# Patient Record
Sex: FEMALE | Race: WHITE | NOT HISPANIC OR LATINO
[De-identification: names, ages, dates, MRNs, and addresses within clinical notes are randomized per-mention and may not be internally consistent; named-entity substitution may affect disease eponyms.]

---

## 2017-07-20 ENCOUNTER — RX RENEWAL (OUTPATIENT)
Age: 57
End: 2017-07-20

## 2018-07-17 ENCOUNTER — RX RENEWAL (OUTPATIENT)
Age: 58
End: 2018-07-17

## 2018-10-31 ENCOUNTER — RX RENEWAL (OUTPATIENT)
Age: 58
End: 2018-10-31

## 2019-01-29 ENCOUNTER — RX RENEWAL (OUTPATIENT)
Age: 59
End: 2019-01-29

## 2019-03-06 ENCOUNTER — APPOINTMENT (OUTPATIENT)
Dept: ENDOCRINOLOGY | Facility: CLINIC | Age: 59
End: 2019-03-06
Payer: COMMERCIAL

## 2019-03-06 VITALS
HEIGHT: 64.5 IN | HEART RATE: 66 BPM | WEIGHT: 114 LBS | SYSTOLIC BLOOD PRESSURE: 120 MMHG | BODY MASS INDEX: 19.23 KG/M2 | OXYGEN SATURATION: 98 % | DIASTOLIC BLOOD PRESSURE: 72 MMHG

## 2019-03-06 PROCEDURE — ZZZZZ: CPT

## 2019-03-06 PROCEDURE — 99214 OFFICE O/P EST MOD 30 MIN: CPT | Mod: 25

## 2019-03-06 PROCEDURE — 77080 DXA BONE DENSITY AXIAL: CPT

## 2019-03-06 NOTE — PHYSICAL EXAM
[Alert] : alert [No Acute Distress] : no acute distress [Well Nourished] : well nourished [Well Developed] : well developed [Normal Sclera/Conjunctiva] : normal sclera/conjunctiva [EOMI] : extra ocular movement intact [No Proptosis] : no proptosis [Normal Oropharynx] : the oropharynx was normal [Thyroid Not Enlarged] : the thyroid was not enlarged [No Thyroid Nodules] : there were no palpable thyroid nodules [No Respiratory Distress] : no respiratory distress [No Accessory Muscle Use] : no accessory muscle use [Clear to Auscultation] : lungs were clear to auscultation bilaterally [Normal Rate] : heart rate was normal  [Normal S1, S2] : normal S1 and S2 [Regular Rhythm] : with a regular rhythm [No Edema] : there was no peripheral edema [Normal Bowel Sounds] : normal bowel sounds [Not Tender] : non-tender [Soft] : abdomen soft [Not Distended] : not distended [Post Cervical Nodes] : posterior cervical nodes [Anterior Cervical Nodes] : anterior cervical nodes [Normal] : normal and non tender [No Spinal Tenderness] : no spinal tenderness [Spine Straight] : spine straight [No Stigmata of Cushings Syndrome] : no stigmata of cushings syndrome [Normal Gait] : normal gait [Normal Strength/Tone] : muscle strength and tone were normal [No Rash] : no rash [Normal Reflexes] : deep tendon reflexes were 2+ and symmetric [No Tremors] : no tremors [Oriented x3] : oriented to person, place, and time

## 2019-03-07 ENCOUNTER — RX RENEWAL (OUTPATIENT)
Age: 59
End: 2019-03-07

## 2019-03-07 LAB
25(OH)D3 SERPL-MCNC: 31.5 NG/ML
ALBUMIN SERPL ELPH-MCNC: 4.8 G/DL
ALP BLD-CCNC: 66 U/L
ALT SERPL-CCNC: 14 U/L
ANION GAP SERPL CALC-SCNC: 18 MMOL/L
AST SERPL-CCNC: 18 U/L
BASOPHILS # BLD AUTO: 0.02 K/UL
BASOPHILS NFR BLD AUTO: 0.5 %
BILIRUB SERPL-MCNC: 0.4 MG/DL
BUN SERPL-MCNC: 16 MG/DL
CALCIUM SERPL-MCNC: 10 MG/DL
CALCIUM SERPL-MCNC: 10 MG/DL
CHLORIDE SERPL-SCNC: 104 MMOL/L
CO2 SERPL-SCNC: 22 MMOL/L
CREAT SERPL-MCNC: 0.68 MG/DL
EOSINOPHIL # BLD AUTO: 0.04 K/UL
EOSINOPHIL NFR BLD AUTO: 0.9 %
GLUCOSE SERPL-MCNC: 107 MG/DL
HCT VFR BLD CALC: 37 %
HGB BLD-MCNC: 12.1 G/DL
IMM GRANULOCYTES NFR BLD AUTO: 0 %
LYMPHOCYTES # BLD AUTO: 1.75 K/UL
LYMPHOCYTES NFR BLD AUTO: 41.5 %
MAN DIFF?: NORMAL
MCHC RBC-ENTMCNC: 32.2 PG
MCHC RBC-ENTMCNC: 32.7 GM/DL
MCV RBC AUTO: 98.4 FL
MONOCYTES # BLD AUTO: 0.35 K/UL
MONOCYTES NFR BLD AUTO: 8.3 %
NEUTROPHILS # BLD AUTO: 2.06 K/UL
NEUTROPHILS NFR BLD AUTO: 48.8 %
PARATHYROID HORMONE INTACT: 28 PG/ML
PHOSPHATE SERPL-MCNC: 4 MG/DL
PLATELET # BLD AUTO: 137 K/UL
POTASSIUM SERPL-SCNC: 3.8 MMOL/L
PROT SERPL-MCNC: 7.3 G/DL
RBC # BLD: 3.76 M/UL
RBC # FLD: 12 %
SODIUM SERPL-SCNC: 144 MMOL/L
TSH SERPL-ACNC: 0.84 UIU/ML
TTG IGA SER IA-ACNC: <1.2 U/ML
TTG IGA SER-ACNC: NEGATIVE
TTG IGG SER IA-ACNC: 1.8 U/ML
TTG IGG SER IA-ACNC: NEGATIVE
WBC # FLD AUTO: 4.22 K/UL

## 2019-03-07 NOTE — PROCEDURE
[FreeTextEntry1] : Bone mineral density 3/6/19\par indication: vs 2015\par spine -3.8 osteoporosis -12.3%\par total hip -2.8 osteoporosis -10.2%\par femoral neck -2.8 osteoporosis -7.7%\par proximal radius -3.8% osteoporosis -14.8%\par \par \par bone mineral density test Marzena 10, 2015\par indication assess response to raloxifene\par spine -2.9, osteoporosis, improved versus 2013, prior result -3.3.\par Total hip -2.3, osteopenia\par Femoral neck -2.3, osteopenia\par Proximal radius -2.4, osteopenia

## 2019-03-07 NOTE — HISTORY OF PRESENT ILLNESS
[Calcium (dietary)] : dietary Calcium [Family History of Breast Cancer] : family history of breast cancer [Regular Dental Follow-Up] : regular dental follow-up [FreeTextEntry1] : 2 1/2 year f/u 58 year-old female  for osteoporosis. Patient generally good health.\par Began raloxifene tolerating well, no leg cramps, hot flashes  or DVT. no new health issues. \par \par Labs BMD 7/2015 improved spine. \par    bone mineral density test  T score -3.3 in spine, -1.5 in femoral neck, -2.3 in  total hip. Patient has no previous history of fractures. She has no unusual risk factors for osteoporosis. She began Fosamax but stopped after she had a syncopal episode. \par Family history is notable for breast cancer her mother.  + routine mammogram, although no recent  Gyn care.  [Amenorrhea] : no past or present history of amenorrhea [Disordered Eating] : no past or present history of disordered eating [Taking Steroids] : no past or present history of taking steroids [Kidney Stones] : no history of kidney stones [High Fall Risk] : no fall risk [Family History of Osteoporosis] : no family history of osteoporosis [Family History of Hip Fracture] : no family history of hip fracture [Hyperparathyroidism] : no hyperparathyroidism [History of Radiation Therapy] : no history of radiation therapy [History of Blood Clots] : no history of blood clots [Previous Fragility Fracture] : no previous fragility fracture

## 2019-03-07 NOTE — ASSESSMENT
[Bisphosphonate Therapy] : Risks  and benefits of bisphosphonate therapy were  discussed with the patient including gastroesophageal irritation, osteonecrosis of the jaw, and atypical femur fractures, and acute phase reaction [Raloxifene Therapy] : Risks and benefits of Raloxifene therapy were discussed with the patient including blood clots, hot flashes, and cramps [FreeTextEntry1] : 58-year-old with low spinal bone density in the early menopausal period. No unusual risk factors for osteoporosis.\par tolerating Evista, but  BMD markedly decreased all sites. \par \par  Requested routine blood work to exclude new secondary causes such as vitamin D deficiency. Options of more aggressive therapy discussed in great detail. The patient has elected to begin ibandronate monthly.\par  Can continue Evista for  family history of breast cancer 90 mother  of breast cancer. .recommend calcium 500 mg per day and vitamin D 1000 units per day. controversy concerning calcium and cardiovascular risk discussed.

## 2019-07-02 ENCOUNTER — APPOINTMENT (OUTPATIENT)
Dept: ENDOCRINOLOGY | Facility: CLINIC | Age: 59
End: 2019-07-02

## 2019-08-21 ENCOUNTER — RX RENEWAL (OUTPATIENT)
Age: 59
End: 2019-08-21

## 2019-11-07 ENCOUNTER — RX RENEWAL (OUTPATIENT)
Age: 59
End: 2019-11-07

## 2019-12-24 ENCOUNTER — APPOINTMENT (OUTPATIENT)
Dept: ENDOCRINOLOGY | Facility: CLINIC | Age: 59
End: 2019-12-24

## 2019-12-31 ENCOUNTER — RX RENEWAL (OUTPATIENT)
Age: 59
End: 2019-12-31

## 2020-03-04 ENCOUNTER — APPOINTMENT (OUTPATIENT)
Dept: ENDOCRINOLOGY | Facility: CLINIC | Age: 60
End: 2020-03-04
Payer: COMMERCIAL

## 2020-03-04 VITALS
WEIGHT: 116 LBS | HEART RATE: 64 BPM | DIASTOLIC BLOOD PRESSURE: 60 MMHG | BODY MASS INDEX: 19.33 KG/M2 | OXYGEN SATURATION: 98 % | HEIGHT: 64.8 IN | SYSTOLIC BLOOD PRESSURE: 104 MMHG

## 2020-03-04 PROCEDURE — 77080 DXA BONE DENSITY AXIAL: CPT

## 2020-03-04 PROCEDURE — 99214 OFFICE O/P EST MOD 30 MIN: CPT | Mod: 25

## 2020-03-04 PROCEDURE — ZZZZZ: CPT

## 2020-03-04 NOTE — HISTORY OF PRESENT ILLNESS
[Calcium (dietary)] : dietary Calcium [Family History of Breast Cancer] : family history of breast cancer [Regular Dental Follow-Up] : regular dental follow-up [FreeTextEntry1] : f/u 59 year-old female  for osteoporosis. Patient generally good health.\par \par Prior bone mineral density test  T score -3.3 in spine, -1.5 in femoral neck, -2.3 in  total hip. Patient has no previous history of fractures. She has no unusual risk factors for osteoporosis. She began Fosamax but stopped after she had a syncopal episode. \par Family history is notable for breast cancer her mother. BMD 7/2015 improved spine. Pt started Evista 11/2016 for primary prevention of breast CA. Taking correctly, tolerating well. No hot flashes, no leg cramps, no DVT. No interval fx. Up to date with mammography and GYN. BMD 3/2019 showed severely low osteoporosis in spine, osteopenia at all other sites. Pt started Boniva 3/2019. Taking correctly, tolerating well. No interval fx, no UGI sx, no thigh pain. No aches & pains. No heartburn. Last DDS within past 3 months. No ONJ. [Amenorrhea] : no past or present history of amenorrhea [Disordered Eating] : no past or present history of disordered eating [Taking Steroids] : no past or present history of taking steroids [Kidney Stones] : no history of kidney stones [High Fall Risk] : no fall risk [Family History of Osteoporosis] : no family history of osteoporosis [Family History of Hip Fracture] : no family history of hip fracture [Hyperparathyroidism] : no hyperparathyroidism [History of Radiation Therapy] : no history of radiation therapy [History of Blood Clots] : no history of blood clots [Previous Fragility Fracture] : no previous fragility fracture

## 2020-03-04 NOTE — PROCEDURE
[FreeTextEntry1] : Bone mineral density: 03/04/2020 \par Indication: vs. 2019 prior test showed bone loss\par Spine: (L2-L4) -4.3 osteoporosis, no significant change\par Total hip: -2.4 osteopenia, +9.1%\par Femoral neck: -2.5 osteoporosis, +5.9%\par Proximal radius: -3.4 osteoporosis, +4.5%\par \par Bone mineral density 3/6/19\par indication: vs 2015\par spine -3.8 osteoporosis -12.3%\par total hip -2.8 osteoporosis -10.2%\par femoral neck -2.8 osteoporosis -7.7%\par proximal radius -3.8% osteoporosis -14.8%\par \par bone mineral density test Marzena 10, 2015\par indication assess response to raloxifene\par spine -2.9, osteoporosis, improved versus 2013, prior result -3.3.\par Total hip -2.3, osteopenia\par Femoral neck -2.3, osteopenia\par Proximal radius -2.4, osteopenia

## 2020-03-04 NOTE — END OF VISIT
[FreeTextEntry3] : I, Stefano Cano, authored this note working as a medical scribe for Dr. Cabrales.  03/04/2020. 11:00AM.  This note was authored by the medical scribe for me. I have reviewed, edited, and revised the note as needed. I am in agreement with the exam findings, imaging findings, and treatment plan.  Jett Cabrales MD

## 2020-03-04 NOTE — ASSESSMENT
[Raloxifene Therapy] : Risks and benefits of Raloxifene therapy were discussed with the patient including blood clots, hot flashes, and cramps [Bisphosphonate Therapy] : Risks  and benefits of bisphosphonate therapy were  discussed with the patient including gastroesophageal irritation, osteonecrosis of the jaw, and atypical femur fractures, and acute phase reaction [FreeTextEntry1] : 59 year-old w/ severe  osteoporosis. \par \par She has no unusual risk factors for osteoporosis. She began Fosamax but stopped after she had a syncopal episode. Family history is notable for breast cancer her mother. BMD 7/2015 improved spine.  Pt started Evista 11/2016 for primary prevention of breast CA. Taking correctly, tolerating well. No hot flashes, no leg cramps, no DVT. No interval fx. \par BMD 3/2019 showed severely low osteoporosis in spine, osteopenia at all other sites. Pt started Boniva 3/2019. Taking correctly, tolerating well. No interval fx, no UGI sx, no thigh pain. No aches & pains. No heartburn. No ONJ. BMD 3/2020 indicates stable but severely low osteoporosis in spine, improving osteopenia in total hip, and improving osteoporosis in femoral neck and proximal radius. BMD results reviewed w/ pt.\par Patient again advised that she has at high risk for fracture based on severely low bone density.  Options of anabolic therapy again discussed in great detail.  Patient has elected to continue Boniva for 1 additional year and repeat bone density then.\par Request labs sent out.\par \par f/u in 1 year w/ repeat BMD

## 2020-03-04 NOTE — PHYSICAL EXAM
[Alert] : alert [Well Nourished] : well nourished [No Acute Distress] : no acute distress [EOMI] : extra ocular movement intact [Well Developed] : well developed [Normal Sclera/Conjunctiva] : normal sclera/conjunctiva [No Proptosis] : no proptosis [Normal Oropharynx] : the oropharynx was normal [Thyroid Not Enlarged] : the thyroid was not enlarged [No Thyroid Nodules] : there were no palpable thyroid nodules [No Respiratory Distress] : no respiratory distress [Normal Rate] : heart rate was normal  [No Accessory Muscle Use] : no accessory muscle use [Clear to Auscultation] : lungs were clear to auscultation bilaterally [Normal S1, S2] : normal S1 and S2 [Regular Rhythm] : with a regular rhythm [Normal Bowel Sounds] : normal bowel sounds [No Edema] : there was no peripheral edema [Soft] : abdomen soft [Not Tender] : non-tender [Not Distended] : not distended [Post Cervical Nodes] : posterior cervical nodes [Normal] : normal and non tender [No Spinal Tenderness] : no spinal tenderness [Anterior Cervical Nodes] : anterior cervical nodes [Spine Straight] : spine straight [No Stigmata of Cushings Syndrome] : no stigmata of cushings syndrome [No Rash] : no rash [Normal Gait] : normal gait [Normal Strength/Tone] : muscle strength and tone were normal [Normal Reflexes] : deep tendon reflexes were 2+ and symmetric [No Tremors] : no tremors [Oriented x3] : oriented to person, place, and time

## 2020-03-05 LAB
ALBUMIN SERPL ELPH-MCNC: 4.7 G/DL
ALP BLD-CCNC: 47 U/L
ALT SERPL-CCNC: 10 U/L
ANION GAP SERPL CALC-SCNC: 12 MMOL/L
AST SERPL-CCNC: 15 U/L
BILIRUB SERPL-MCNC: 0.4 MG/DL
BUN SERPL-MCNC: 17 MG/DL
CALCIUM SERPL-MCNC: 9.4 MG/DL
CHLORIDE SERPL-SCNC: 108 MMOL/L
CO2 SERPL-SCNC: 25 MMOL/L
CREAT SERPL-MCNC: 0.59 MG/DL
GLUCOSE SERPL-MCNC: 110 MG/DL
POTASSIUM SERPL-SCNC: 4.7 MMOL/L
PROT SERPL-MCNC: 6.7 G/DL
SODIUM SERPL-SCNC: 145 MMOL/L

## 2020-07-31 ENCOUNTER — RX RENEWAL (OUTPATIENT)
Age: 60
End: 2020-07-31

## 2021-04-09 ENCOUNTER — RX RENEWAL (OUTPATIENT)
Age: 61
End: 2021-04-09

## 2021-04-15 ENCOUNTER — APPOINTMENT (OUTPATIENT)
Dept: ENDOCRINOLOGY | Facility: CLINIC | Age: 61
End: 2021-04-15
Payer: COMMERCIAL

## 2021-04-15 VITALS — BODY MASS INDEX: 19.23 KG/M2 | TEMPERATURE: 98 F | HEIGHT: 64.6 IN | WEIGHT: 114 LBS

## 2021-04-15 VITALS
OXYGEN SATURATION: 98 % | TEMPERATURE: 97.2 F | HEART RATE: 74 BPM | SYSTOLIC BLOOD PRESSURE: 110 MMHG | BODY MASS INDEX: 19.21 KG/M2 | WEIGHT: 114 LBS | DIASTOLIC BLOOD PRESSURE: 80 MMHG

## 2021-04-15 DIAGNOSIS — Z91.89 OTHER SPECIFIED PERSONAL RISK FACTORS, NOT ELSEWHERE CLASSIFIED: ICD-10-CM

## 2021-04-15 DIAGNOSIS — M81.0 AGE-RELATED OSTEOPOROSIS W/OUT CURRENT PATHOLOGICAL FRACTURE: ICD-10-CM

## 2021-04-15 PROCEDURE — ZZZZZ: CPT

## 2021-04-15 PROCEDURE — 99214 OFFICE O/P EST MOD 30 MIN: CPT | Mod: 25

## 2021-04-15 PROCEDURE — 77080 DXA BONE DENSITY AXIAL: CPT

## 2021-04-15 PROCEDURE — 99072 ADDL SUPL MATRL&STAF TM PHE: CPT

## 2021-04-16 NOTE — PROCEDURE
[FreeTextEntry1] : Bone mineral density: 04/15/2021 \par Indication: vs. 2020 assess response to medication\par Spine: (L2-L4) -4.2 osteoporosis, no significant change\par Total hip: -2.4 osteopenia, no significant change\par Femoral neck: -2.7 osteoporosis, no significant change\par Proximal radius: -3.4 osteoporosis, no significant change\par \par Bone mineral density: 03/04/2020 \par Indication: vs. 2019 prior test showed bone loss\par Spine: (L2-L4) -4.3 osteoporosis, no significant change\par Total hip: -2.4 osteopenia, +9.1%\par Femoral neck: -2.5 osteoporosis, +5.9%\par Proximal radius: -3.4 osteoporosis, +4.5%\par \par Bone mineral density 3/6/19\par indication: vs 2015\par spine -3.8 osteoporosis -12.3%\par total hip -2.8 osteoporosis -10.2%\par femoral neck -2.8 osteoporosis -7.7%\par proximal radius -3.8% osteoporosis -14.8%\par \par bone mineral density test Marzena 10, 2015\par indication assess response to raloxifene\par spine -2.9, osteoporosis, improved versus 2013, prior result -3.3.\par Total hip -2.3, osteopenia\par Femoral neck -2.3, osteopenia\par Proximal radius -2.4, osteopenia

## 2021-04-16 NOTE — PHYSICAL EXAM
[Alert] : alert [Well Nourished] : well nourished [No Acute Distress] : no acute distress [Well Developed] : well developed [Normal Sclera/Conjunctiva] : normal sclera/conjunctiva [EOMI] : extra ocular movement intact [No Proptosis] : no proptosis [Thyroid Not Enlarged] : the thyroid was not enlarged [No Thyroid Nodules] : no palpable thyroid nodules [Clear to Auscultation] : lungs were clear to auscultation bilaterally [Normal S1, S2] : normal S1 and S2 [Normal Rate] : heart rate was normal [Regular Rhythm] : with a regular rhythm [No Edema] : no peripheral edema [Normal Bowel Sounds] : normal bowel sounds [Not Tender] : non-tender [Not Distended] : not distended [Soft] : abdomen soft [Normal Anterior Cervical Nodes] : no anterior cervical lymphadenopathy [No Spinal Tenderness] : no spinal tenderness [Spine Straight] : spine straight [No Stigmata of Cushings Syndrome] : no stigmata of Cushings Syndrome [Normal Gait] : normal gait [Normal Reflexes] : deep tendon reflexes were 2+ and symmetric [No Tremors] : no tremors [Oriented x3] : oriented to person, place, and time [de-identified] : vague fullness in distal right femur

## 2021-04-16 NOTE — ASSESSMENT
[Denosumab Therapy] : Risks  and benefits of denosumab therapy were discussed with the patient including eczema, cellulitis, osteonecrosis of the jaw and atypical femur fractures [Teriparatide/Abaloparatide Therapy] : Risks and benefits of Teriparatide/Abaloparatide therapy were discussed with the patient including potential risk of osteosarcoma [Other____] : Risks and benefits of [unfilled] was discussed with the patient. [FreeTextEntry1] : 60 year-old w/ severe  osteoporosis. \par \par She has no unusual risk factors for osteoporosis. She began Fosamax but stopped after she had a syncopal episode. Family history is notable for breast cancer her mother. BMD 7/2015 improved spine.  Pt started Evista 11/2016 for primary prevention of breast CA. Taking correctly, tolerating well. No hot flashes, no leg cramps, no DVT. No interval fx. C/w Evista.\par  \par BMD 3/2019 showed severely low osteoporosis in spine, osteopenia at all other sites. Pt started Boniva 3/2019. Took correctly, tolerated well. No interval fx, no UGI sx. No aches & pains. No heartburn. No ONJ. BMD 3/2020 indicates stable but severely low osteoporosis in spine, improving osteopenia in total hip, and improving osteoporosis in femoral neck and proximal radius. BMD 4/2021 indicates stable but low osteoporosis in spine, stable osteoporosis in femoral neck and proximal radius; stable osteopenia in total hip. BMD results reviewed w/ pt.\par \par Of note, pt had thigh pain 12/2020, d/c Boniva for 1 month and restarted Boniva 1/2021, no recurrent thigh pain. Pt then had persistent diarrhea, then later developed c. diff 1/2021, resolved, then had a second bout 2/2020, pt d/c Boniva later 1/2021. PE indicated vague fullness in distal right femur. Pt requests x-ray, referred to radiology.\par \par Options of therapy discussed in great detail. I discussed that pt cannot stop Prolia without expecting rapid bone loss and increase in risk for future fx unless they transition to another rx. Furthermore, there is 10 year safety data for Prolia. Pt can consider bone anabolic therapy for more aggressive bone building treatment w/ Forteo and Tymlos. Risks and benefits discussed including blood Ca elevation, lightheadedness, palpitations, or dizziness. In animal models long term use has shown increased risk for bone CA, though never seen in humans. Pt would be on this treatment for <2 years followed by another osteoporosis rx to prevent rapid bone loss. Pt can transition to bisphosphonate therapy or Prolia after taking Tymlos or Forteo after <2 years. I also discussed monthly Evenity (romosozumab), an anti-sclersotin antibody given for 1 year. Pt would have to transition to another osteoporosis rx such has Prolia or bisphosphonate therapy after 1 year to avoid long term risks. Risks and benefits discussed including possible cardiovascular issues. All questions were answered. Rx information handout provided. Pt will make a decision and contact me. C/w Allan until she makes a decision.\par \par Call Dr. Galeano for labs.\par \par F/u TBD

## 2021-04-16 NOTE — END OF VISIT
[FreeTextEntry3] : I, Stefano Cano, authored this note working as a medical scribe for Dr. Cabrales.  04/15/2021.  3:00PM. This note was authored by the medical scribe for me. I have reviewed, edited, and revised the note as needed. I am in agreement with the exam findings, imaging findings, and treatment plan.  Jett Cabrales MD

## 2021-04-16 NOTE — HISTORY OF PRESENT ILLNESS
[Calcium (dietary)] : dietary Calcium [Ibandronate (Boniva)] : Ibandronate [Family History of Breast Cancer] : family history of breast cancer [Regular Dental Follow-Up] : regular dental follow-up [FreeTextEntry1] : No interval fx.\par \par Prior bone mineral density test  T score -3.3 in spine, -1.5 in femoral neck, -2.3 in  total hip. Patient has no previous history of fractures. She has no unusual risk factors for osteoporosis. She began Fosamax but stopped after she had a syncopal episode. \par Family history is notable for breast cancer her mother. BMD 7/2015 improved spine. Pt started Evista 11/2016 for primary prevention of breast CA. Taking correctly, tolerating well. No hot flashes, no leg cramps, no DVT. No interval fx. Up to date with mammography and GYN. BMD 3/2019 showed severely low osteoporosis in spine, osteopenia at all other sites. Pt started Boniva 3/2019. Took correctly, tolerated well. No interval fx, no UGI sx. No aches & pains. No heartburn. Last DDS within past 3 months. No ONJ\par . Pt had thigh pain 12/2020, d/c Boniva for 1 month and restarted Boniva 1/2021, no recurrent thigh pain. Pt then had persistent diarrhea, then later developed c. diff 1/2021, resolved, then had a second bout 2/2020, pt d/c Boniva later 1/2021. [Amenorrhea] : no past or present history of amenorrhea [Disordered Eating] : no past or present history of disordered eating [Taking Steroids] : no past or present history of taking steroids [Kidney Stones] : no history of kidney stones [High Fall Risk] : no fall risk [Family History of Osteoporosis] : no family history of osteoporosis [Family History of Hip Fracture] : no family history of hip fracture [Hyperparathyroidism] : no hyperparathyroidism [History of Radiation Therapy] : no history of radiation therapy [History of Blood Clots] : no history of blood clots [Previous Fragility Fracture] : no previous fragility fracture

## 2021-06-23 ENCOUNTER — RX RENEWAL (OUTPATIENT)
Age: 61
End: 2021-06-23

## 2021-06-23 RX ORDER — IBANDRONATE SODIUM 150 MG/1
150 TABLET ORAL
Qty: 3 | Refills: 3 | Status: ACTIVE | COMMUNITY
Start: 2019-03-06 | End: 1900-01-01

## 2021-07-26 ENCOUNTER — RX RENEWAL (OUTPATIENT)
Age: 61
End: 2021-07-26

## 2022-11-02 ENCOUNTER — APPOINTMENT (OUTPATIENT)
Dept: MAMMOGRAPHY | Facility: CLINIC | Age: 62
End: 2022-11-02
Payer: COMMERCIAL

## 2022-11-02 ENCOUNTER — APPOINTMENT (OUTPATIENT)
Dept: ULTRASOUND IMAGING | Facility: CLINIC | Age: 62
End: 2022-11-02

## 2022-11-02 PROCEDURE — 77063 BREAST TOMOSYNTHESIS BI: CPT

## 2022-11-02 PROCEDURE — 77067 SCR MAMMO BI INCL CAD: CPT

## 2022-11-02 PROCEDURE — 76641 ULTRASOUND BREAST COMPLETE: CPT | Mod: 50

## 2025-08-26 ENCOUNTER — TRANSCRIPTION ENCOUNTER (OUTPATIENT)
Age: 65
End: 2025-08-26

## 2025-08-26 ENCOUNTER — APPOINTMENT (OUTPATIENT)
Dept: ULTRASOUND IMAGING | Facility: CLINIC | Age: 65
End: 2025-08-26

## 2025-08-26 ENCOUNTER — APPOINTMENT (OUTPATIENT)
Dept: MAMMOGRAPHY | Facility: CLINIC | Age: 65
End: 2025-08-26
Payer: COMMERCIAL

## 2025-08-26 PROCEDURE — 77067 SCR MAMMO BI INCL CAD: CPT | Mod: 59

## 2025-08-26 PROCEDURE — 76641 ULTRASOUND BREAST COMPLETE: CPT | Mod: 50

## 2025-08-26 PROCEDURE — 77063 BREAST TOMOSYNTHESIS BI: CPT | Mod: 59

## 2025-08-26 PROCEDURE — 77065 DX MAMMO INCL CAD UNI: CPT | Mod: GG,RT

## 2025-09-02 ENCOUNTER — APPOINTMENT (OUTPATIENT)
Dept: ULTRASOUND IMAGING | Facility: CLINIC | Age: 65
End: 2025-09-02
Payer: COMMERCIAL

## 2025-09-02 PROCEDURE — 19083 BX BREAST 1ST LESION US IMAG: CPT | Mod: RT

## 2025-09-02 PROCEDURE — 77065 DX MAMMO INCL CAD UNI: CPT | Mod: RT

## 2025-09-02 PROCEDURE — A4648: CPT

## 2025-09-02 PROCEDURE — 19084Z: CUSTOM
